# Patient Record
Sex: MALE | Race: WHITE | Employment: FULL TIME | URBAN - METROPOLITAN AREA
[De-identification: names, ages, dates, MRNs, and addresses within clinical notes are randomized per-mention and may not be internally consistent; named-entity substitution may affect disease eponyms.]

---

## 2020-04-04 ENCOUNTER — APPOINTMENT (OUTPATIENT)
Dept: CT IMAGING | Age: 60
End: 2020-04-04
Attending: EMERGENCY MEDICINE
Payer: COMMERCIAL

## 2020-04-04 ENCOUNTER — HOSPITAL ENCOUNTER (EMERGENCY)
Age: 60
Discharge: HOME | End: 2020-04-04
Attending: EMERGENCY MEDICINE
Payer: COMMERCIAL

## 2020-04-04 VITALS
SYSTOLIC BLOOD PRESSURE: 141 MMHG | DIASTOLIC BLOOD PRESSURE: 82 MMHG | HEART RATE: 94 BPM | HEIGHT: 70 IN | TEMPERATURE: 98.7 F | OXYGEN SATURATION: 94 % | RESPIRATION RATE: 19 BRPM

## 2020-04-04 DIAGNOSIS — R79.89 ELEVATED LFTS: ICD-10-CM

## 2020-04-04 DIAGNOSIS — R56.9 SEIZURE (HCC): Primary | ICD-10-CM

## 2020-04-04 LAB
ALBUMIN SERPL-MCNC: 3.9 G/DL (ref 3.5–5)
ALBUMIN/GLOB SERPL: 1.1 {RATIO} (ref 1.1–2.2)
ALP SERPL-CCNC: 61 U/L (ref 45–117)
ALT SERPL-CCNC: 350 U/L (ref 12–78)
ANION GAP SERPL CALC-SCNC: 14 MMOL/L (ref 5–15)
AST SERPL-CCNC: 308 U/L (ref 15–37)
ATRIAL RATE: 98 BPM
BASOPHILS # BLD: 0 K/UL (ref 0–0.1)
BASOPHILS NFR BLD: 1 % (ref 0–1)
BILIRUB SERPL-MCNC: 1.3 MG/DL (ref 0.2–1)
BUN SERPL-MCNC: 18 MG/DL (ref 6–20)
BUN/CREAT SERPL: 16 (ref 12–20)
CALCIUM SERPL-MCNC: 9.3 MG/DL (ref 8.5–10.1)
CALCULATED P AXIS, ECG09: 46 DEGREES
CALCULATED R AXIS, ECG10: 22 DEGREES
CALCULATED T AXIS, ECG11: 34 DEGREES
CHLORIDE SERPL-SCNC: 98 MMOL/L (ref 97–108)
CO2 SERPL-SCNC: 21 MMOL/L (ref 21–32)
COMMENT, HOLDF: NORMAL
CREAT SERPL-MCNC: 1.15 MG/DL (ref 0.7–1.3)
DIAGNOSIS, 93000: NORMAL
DIFFERENTIAL METHOD BLD: ABNORMAL
EOSINOPHIL # BLD: 0.1 K/UL (ref 0–0.4)
EOSINOPHIL NFR BLD: 1 % (ref 0–7)
ERYTHROCYTE [DISTWIDTH] IN BLOOD BY AUTOMATED COUNT: 13 % (ref 11.5–14.5)
GLOBULIN SER CALC-MCNC: 3.6 G/DL (ref 2–4)
GLUCOSE BLD STRIP.AUTO-MCNC: 169 MG/DL (ref 65–100)
GLUCOSE SERPL-MCNC: 139 MG/DL (ref 65–100)
HCT VFR BLD AUTO: 36.4 % (ref 36.6–50.3)
HGB BLD-MCNC: 12.4 G/DL (ref 12.1–17)
IMM GRANULOCYTES # BLD AUTO: 0 K/UL (ref 0–0.04)
IMM GRANULOCYTES NFR BLD AUTO: 1 % (ref 0–0.5)
INR PPP: 1.1 (ref 0.9–1.1)
LYMPHOCYTES # BLD: 0.9 K/UL (ref 0.8–3.5)
LYMPHOCYTES NFR BLD: 16 % (ref 12–49)
MCH RBC QN AUTO: 32.1 PG (ref 26–34)
MCHC RBC AUTO-ENTMCNC: 34.1 G/DL (ref 30–36.5)
MCV RBC AUTO: 94.3 FL (ref 80–99)
MONOCYTES # BLD: 0.6 K/UL (ref 0–1)
MONOCYTES NFR BLD: 12 % (ref 5–13)
NEUTS SEG # BLD: 3.6 K/UL (ref 1.8–8)
NEUTS SEG NFR BLD: 69 % (ref 32–75)
NRBC # BLD: 0 K/UL (ref 0–0.01)
NRBC BLD-RTO: 0 PER 100 WBC
P-R INTERVAL, ECG05: 166 MS
PLATELET # BLD AUTO: 113 K/UL (ref 150–400)
PMV BLD AUTO: 9.8 FL (ref 8.9–12.9)
POTASSIUM SERPL-SCNC: 3.4 MMOL/L (ref 3.5–5.1)
PROT SERPL-MCNC: 7.5 G/DL (ref 6.4–8.2)
PROTHROMBIN TIME: 11.5 SEC (ref 9–11.1)
Q-T INTERVAL, ECG07: 364 MS
QRS DURATION, ECG06: 94 MS
QTC CALCULATION (BEZET), ECG08: 464 MS
RBC # BLD AUTO: 3.86 M/UL (ref 4.1–5.7)
SAMPLES BEING HELD,HOLD: NORMAL
SERVICE CMNT-IMP: ABNORMAL
SODIUM SERPL-SCNC: 133 MMOL/L (ref 136–145)
TROPONIN I SERPL-MCNC: <0.05 NG/ML
VENTRICULAR RATE, ECG03: 98 BPM
WBC # BLD AUTO: 5.2 K/UL (ref 4.1–11.1)

## 2020-04-04 PROCEDURE — 84484 ASSAY OF TROPONIN QUANT: CPT

## 2020-04-04 PROCEDURE — 70496 CT ANGIOGRAPHY HEAD: CPT

## 2020-04-04 PROCEDURE — 74011636320 HC RX REV CODE- 636/320: Performed by: EMERGENCY MEDICINE

## 2020-04-04 PROCEDURE — 82962 GLUCOSE BLOOD TEST: CPT

## 2020-04-04 PROCEDURE — 85610 PROTHROMBIN TIME: CPT

## 2020-04-04 PROCEDURE — 80053 COMPREHEN METABOLIC PANEL: CPT

## 2020-04-04 PROCEDURE — 36415 COLL VENOUS BLD VENIPUNCTURE: CPT

## 2020-04-04 PROCEDURE — 93005 ELECTROCARDIOGRAM TRACING: CPT

## 2020-04-04 PROCEDURE — 70450 CT HEAD/BRAIN W/O DYE: CPT

## 2020-04-04 PROCEDURE — 85025 COMPLETE CBC W/AUTO DIFF WBC: CPT

## 2020-04-04 PROCEDURE — 99285 EMERGENCY DEPT VISIT HI MDM: CPT

## 2020-04-04 RX ORDER — SODIUM CHLORIDE 0.9 % (FLUSH) 0.9 %
10 SYRINGE (ML) INJECTION
Status: COMPLETED | OUTPATIENT
Start: 2020-04-04 | End: 2020-04-04

## 2020-04-04 RX ADMIN — Medication 10 ML: at 00:18

## 2020-04-04 RX ADMIN — IOPAMIDOL 100 ML: 755 INJECTION, SOLUTION INTRAVENOUS at 00:18

## 2020-04-04 NOTE — ED NOTES
Patient arrives via EMS after they were dispatched for a seizure where family states it lasted for about 60 seconds. After the seizure patient ws found to have a L sided facial droop. Patient does not have any prior history of strokes or seizures. Patient arrives to ED where all symptoms had resolved, patient still appears a tad bit confused. Patient reports he departed from [place] today to move his daughter back home. 2327- Discharge instructions given to patient by Dr. Jessica Pete. Verbalized understanding of instructions. Patient discharged without difficulty. Patient discharged in stable condition ambulatory.

## 2020-04-04 NOTE — ED PROVIDER NOTES
EMERGENCY DEPARTMENT HISTORY AND PHYSICAL EXAM      Date: 4/4/2020  Patient Name: Daryl Hahn III    History of Presenting Illness     Chief Complaint   Patient presents with    Seizure       History Provided By: Patient    HPI: Francesca Munoz, 61 y.o. male  With no significant past medical history presenting today with seizure. The patient was in the car with his daughter going to get gas at around 1120 when he had seizure-like activity that lasted for around 1 minute. Reportedly when he stopped having seizure activity he had a left-sided facial droop and was not appropriately responding. He was called as a code stroke prior to arrival.  The patient had complete resolution of his neurologic symptoms prior to arrival in the emergency department. He does not know what happened. He denies any previous history of seizure. No recent illnesses. He had a normal blood glucose 114, heart rate was in the 120s upon arrival, the patient is hypertensive 180/100. There are no other complaints, changes, or physical findings at this time. PCP: None    No current facility-administered medications on file prior to encounter. No current outpatient medications on file prior to encounter. Past History     Past Medical History:  No past medical history on file. Denies previous medical history    Past Surgical History:  No past surgical history on file. Family History:  No family history on file.     Social History:  Social History     Tobacco Use    Smoking status: Never Smoker    Smokeless tobacco: Never Used   Substance Use Topics    Alcohol use: Yes     Comment: occ    Drug use: Not Currently   Denies illicit drug use    Allergies:  No Known Allergies      Review of Systems   Constitutional: No  fever  Skin: No  rash  HEENT: No  nasal congestion  Resp: No cough  CV: No chest pain  GI: No vomiting  : No dysuria  MSK: No joint pain  Neuro: No numbness, + seizure like activity  Psych: No suicidal      Physical Exam     Patient Vitals for the past 12 hrs:   Temp Pulse Resp BP SpO2   04/04/20 0130  94 19 141/82 94 %   04/04/20 0040  99 16  97 %   04/04/20 0039 98.7 °F (37.1 °C) (!) 106 18 158/76 96 %   04/04/20 0031    158/76 98 %     General: alert, No acute distress  Eyes: EOMI, normal conjunctiva  ENT: moist mucous membranes. Neck: Active, full ROM of neck. Skin: No rashes. no jaundice              Lungs: Equal chest expansion. no respiratory distress. clear to auscultation bilaterally No accessory muscle usage  Heart: regular rate     no peripheral edema   2+ radial pulses and DPs bilaterally  Abd:  non distended soft, nontender. No rebound tenderness. No guarding  Back: Full ROM  MSK: Full, active ROM in all 4 extremities. Neuro: Person, Place, Time and Situation; normal speech; No facial droop, normal strength and sensation in bilateral upper and lower extremities. Psych: Cooperative with exam; Appropriate mood and affect             Diagnostic Study Results     Labs -     Recent Results (from the past 12 hour(s))   GLUCOSE, POC    Collection Time: 04/04/20 12:31 AM   Result Value Ref Range    Glucose (POC) 169 (H) 65 - 100 mg/dL    Performed by Hannah Orozco ([facility])    CBC WITH AUTOMATED DIFF    Collection Time: 04/04/20 12:37 AM   Result Value Ref Range    WBC 5.2 4.1 - 11.1 K/uL    RBC 3.86 (L) 4.10 - 5.70 M/uL    HGB 12.4 12.1 - 17.0 g/dL    HCT 36.4 (L) 36.6 - 50.3 %    MCV 94.3 80.0 - 99.0 FL    MCH 32.1 26.0 - 34.0 PG    MCHC 34.1 30.0 - 36.5 g/dL    RDW 13.0 11.5 - 14.5 %    PLATELET 467 (L) 017 - 400 K/uL    MPV 9.8 8.9 - 12.9 FL    NRBC 0.0 0  WBC    ABSOLUTE NRBC 0.00 0.00 - 0.01 K/uL    NEUTROPHILS 69 32 - 75 %    LYMPHOCYTES 16 12 - 49 %    MONOCYTES 12 5 - 13 %    EOSINOPHILS 1 0 - 7 %    BASOPHILS 1 0 - 1 %    IMMATURE GRANULOCYTES 1 (H) 0.0 - 0.5 %    ABS. NEUTROPHILS 3.6 1.8 - 8.0 K/UL    ABS. LYMPHOCYTES 0.9 0.8 - 3.5 K/UL    ABS. MONOCYTES 0.6 0.0 - 1.0 K/UL    ABS. EOSINOPHILS 0.1 0.0 - 0.4 K/UL    ABS. BASOPHILS 0.0 0.0 - 0.1 K/UL    ABS. IMM. GRANS. 0.0 0.00 - 0.04 K/UL    DF AUTOMATED     METABOLIC PANEL, COMPREHENSIVE    Collection Time: 04/04/20 12:37 AM   Result Value Ref Range    Sodium 133 (L) 136 - 145 mmol/L    Potassium 3.4 (L) 3.5 - 5.1 mmol/L    Chloride 98 97 - 108 mmol/L    CO2 21 21 - 32 mmol/L    Anion gap 14 5 - 15 mmol/L    Glucose 139 (H) 65 - 100 mg/dL    BUN 18 6 - 20 MG/DL    Creatinine 1.15 0.70 - 1.30 MG/DL    BUN/Creatinine ratio 16 12 - 20      GFR est AA >60 >60 ml/min/1.73m2    GFR est non-AA >60 >60 ml/min/1.73m2    Calcium 9.3 8.5 - 10.1 MG/DL    Bilirubin, total 1.3 (H) 0.2 - 1.0 MG/DL    ALT (SGPT) 350 (H) 12 - 78 U/L    AST (SGOT) 308 (H) 15 - 37 U/L    Alk. phosphatase 61 45 - 117 U/L    Protein, total 7.5 6.4 - 8.2 g/dL    Albumin 3.9 3.5 - 5.0 g/dL    Globulin 3.6 2.0 - 4.0 g/dL    A-G Ratio 1.1 1.1 - 2.2     PROTHROMBIN TIME + INR    Collection Time: 04/04/20 12:37 AM   Result Value Ref Range    INR 1.1 0.9 - 1.1      Prothrombin time 11.5 (H) 9.0 - 11.1 sec   TROPONIN I    Collection Time: 04/04/20 12:37 AM   Result Value Ref Range    Troponin-I, Qt. <0.05 <0.05 ng/mL   SAMPLES BEING HELD    Collection Time: 04/04/20 12:37 AM   Result Value Ref Range    SAMPLES BEING HELD RD     COMMENT        Add-on orders for these samples will be processed based on acceptable specimen integrity and analyte stability, which may vary by analyte. Radiologic Studies -   CTA HEAD NECK W CONT         CT HEAD WO CONT   Final Result   IMPRESSION:    1. No acute intracranial hemorrhage. 2. Age-indeterminate mild microvascular ischemic disease in the periventricular   white matter. Small prominent perivascular space versus age indeterminate but   likely chronic lacunar infarct in the left frontal subcortical white matter. CXR Results  (Last 48 hours)    None          Medical Decision Making   I am the first provider for this patient.     I reviewed the vital signs, available nursing notes, past medical history, past surgical history, family history and social history. Provider Notes (Medical Decision Making):     Differential Diagnosis: Stroke, ICH, SAH, electrolyte derangement, seizure    Initial Plan: Patient presents today with seizure and reported facial droop that has since resolved. He has no previous history of seizure. Will obtain CT head, CT angiography, electrolytes, EKG and reassess. On my initial evaluation the patient does not have any focal neurologic deficits. He is hypertensive and tachycardic, but without any distress at this time. ED Course:   Initial assessment performed. The patients presenting problems have been discussed, and they are in agreement with the care plan formulated and outlined with them. I have encouraged them to ask questions as they arise throughout their visit. ED Course as of Apr 04 0447   Sat Apr 04, 2020   0038 On My interpretation of the patient's EKG normal sinus rhythm at a rate of 98, QTc is 464, no ST elevation or depression. No evidence of arrhythmia. [NW]   9229 On my interpretation of the CT head, no acute bleeding. CTA without evidence of large vessel occlusion.    [NW]   0127 My interpretation of the patient's laboratory studies, unremarkable CBC, troponin negative, metabolic panel remarkable for an elevated ALT and AST. This is consistent with the patient's reported history of alcohol use. [NW]   8561 The patient presents today after seizure-like activity. 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 He has hypertension, but otherwise normal vital signs. He is asymptomatic now, with no acute neurologic deficit. This is a first-time seizure for the patient. We discussed seizure precautions including not driving, swimming, or bathing alone. He is actually from [place] and is driving from [place] with his daughter.   We have discussed extensively that he will need to follow-up with his primary doctor given this finding of old stroke on his CT scan, and he will need to follow-up with neurology for the first time seizure. He understands and is comfortable with the plan for discharge. [NW]      ED Course User Index  [NW] Cherise Davison MD       I, Kylie Leung MD, am the attending of record for this patient encounter. Dispo: Discharged. The patient has been re-evaluated and is ready for discharge. Reviewed available results with patient. Counseled patient on diagnosis and care plan. Patient has expressed understanding, and all questions have been answered. Patient agrees with plan and agrees to follow up as recommended, or to return to the ED if their symptoms worsen. Discharge instructions have been provided and explained to the patient, along with reasons to return to the ED. PLAN:  There are no discharge medications for this patient. 2.     Follow-up Information    None       3. Return to ED if worse       Diagnosis     Clinical Impression:   1. Seizure ([address]    2. Elevated LFTs        Attestations:    Kylie Leung MD    Please note that this dictation was completed with [employer], the computer voice recognition software. Quite often unanticipated grammatical, syntax, homophones, and other interpretive errors are inadvertently transcribed by the computer software. Please disregard these errors. Please excuse any errors that have escaped final proofreading. Thank you.

## 2020-04-04 NOTE — DISCHARGE INSTRUCTIONS
When you return to your hometown you will need to follow-up with your primary care provider who can likely refer you to a neurologist given the seizure. I have printed the results for your CT scan for your records.